# Patient Record
Sex: FEMALE | Race: WHITE | NOT HISPANIC OR LATINO | URBAN - METROPOLITAN AREA
[De-identification: names, ages, dates, MRNs, and addresses within clinical notes are randomized per-mention and may not be internally consistent; named-entity substitution may affect disease eponyms.]

---

## 2017-03-31 ENCOUNTER — IMPORTED ENCOUNTER (OUTPATIENT)
Dept: URBAN - METROPOLITAN AREA CLINIC 31 | Facility: CLINIC | Age: 42
End: 2017-03-31

## 2017-03-31 PROCEDURE — 92014 COMPRE OPH EXAM EST PT 1/>: CPT

## 2017-03-31 PROCEDURE — 92310 CONTACT LENS FITTING OU: CPT

## 2017-03-31 PROCEDURE — 92015 DETERMINE REFRACTIVE STATE: CPT

## 2018-07-20 ENCOUNTER — IMPORTED ENCOUNTER (OUTPATIENT)
Dept: URBAN - METROPOLITAN AREA CLINIC 31 | Facility: CLINIC | Age: 43
End: 2018-07-20

## 2018-07-20 PROBLEM — H10.413: Noted: 2018-07-20

## 2018-07-20 PROCEDURE — V2520 CONTACT LENS HYDROPHILIC: HCPCS

## 2018-07-20 PROCEDURE — 92014 COMPRE OPH EXAM EST PT 1/>: CPT

## 2018-07-20 PROCEDURE — 92015 DETERMINE REFRACTIVE STATE: CPT

## 2018-07-20 PROCEDURE — 92310 CONTACT LENS FITTING OU: CPT

## 2018-07-20 NOTE — PATIENT DISCUSSION
1.  Hx GPC ou- been better with dailies. 2.  Cont with Moist Dailies-2.25 ou- order years supply. Right Dominant. Pt will need some near soon. Fit 80 with copay of $40 and gets $130 for materials. Eyemed. 3.   RTN 1 yr CE    [place]

## 2019-10-08 ENCOUNTER — IMPORTED ENCOUNTER (OUTPATIENT)
Dept: URBAN - METROPOLITAN AREA CLINIC 31 | Facility: CLINIC | Age: 44
End: 2019-10-08

## 2019-10-08 PROBLEM — H10.413: Noted: 2019-10-08

## 2019-10-08 PROCEDURE — 92015 DETERMINE REFRACTIVE STATE: CPT

## 2019-10-08 PROCEDURE — V2520 CONTACT LENS HYDROPHILIC: HCPCS

## 2019-10-08 PROCEDURE — 92310 CONTACT LENS FITTING OU: CPT

## 2019-10-08 PROCEDURE — 92014 COMPRE OPH EXAM EST PT 1/>: CPT

## 2019-10-08 NOTE — PATIENT DISCUSSION
1.  Hx GPC ou- been better with dailies. 2.  Cont with Moist Dailies-2.25 ou- order years supply. Right Dominant. Pt will need some near soon. Fit 90 with 10% disc and gets $130 for materials. Eyemed. 3. Borderline Diabetic---  4.   RTN 1 yr CE    [place]

## 2022-04-02 ASSESSMENT — TONOMETRY
OS_IOP_MMHG: 15
OD_IOP_MMHG: 15
OD_IOP_MMHG: 13
OS_IOP_MMHG: 13
OD_IOP_MMHG: 14
OS_IOP_MMHG: 14